# Patient Record
Sex: FEMALE | Race: BLACK OR AFRICAN AMERICAN | NOT HISPANIC OR LATINO | Employment: UNEMPLOYED | URBAN - METROPOLITAN AREA
[De-identification: names, ages, dates, MRNs, and addresses within clinical notes are randomized per-mention and may not be internally consistent; named-entity substitution may affect disease eponyms.]

---

## 2017-11-09 DIAGNOSIS — D21.10: Primary | ICD-10-CM

## 2017-11-21 ENCOUNTER — CLINICAL SUPPORT (OUTPATIENT)
Dept: REHABILITATION | Facility: HOSPITAL | Age: 47
End: 2017-11-21
Attending: SURGERY
Payer: MEDICAID

## 2017-11-21 DIAGNOSIS — M79.642 LEFT HAND PAIN: ICD-10-CM

## 2017-11-21 DIAGNOSIS — M25.649 DECREASED ROM OF FINGER: ICD-10-CM

## 2017-11-21 PROCEDURE — 97165 OT EVAL LOW COMPLEX 30 MIN: CPT | Mod: PO

## 2017-11-21 NOTE — PLAN OF CARE
Occupational Therapy -Hand / Wrist  Evaluation    Patient: Hoda Jaime  Date of Evaluation: 11/21/2017  Referring Physician: Seamus Figueroa III,*  Diagnosis:   1. Left hand pain     2. Decreased ROM of finger       MRN: [id number]  Age: 47 y.o.  Sex: female     Referral Orders:   Eval and treat   Visits Approved: 20    Start Time: 11  End Time: 12  Total Time: 60 min     Hand dominance: Right      Subjective     Hoda is a 47 y.o. 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 No cultural, environmental, or spiritual barriers identified to treatment or learning.    Pain:  Functional Pain Scale Rating 0-10: 3/10 on average  Location: Left Hand  Description: Aching    Surgery 11/1    Occupation:    Working presently: unemployed  Workmen's Compensation:  no  Duties: n/a    Past Medical History/Physical Systems Review:   No past medical history on file.  Review of patient's allergies indicates:  Allergies not on file  No current outpatient prescriptions on file.     No current facility-administered medications for this visit.          Barriers:  Environmental Concerns/ Fall Risk:  None  Barriers to Learning: None   Cultural/Spiritual : None   Developmental/Education: None   Abuse/ Neglect: none   Nutritional Deficit: None   Language: None   Hearing/Vision Deficit: None   Other: No pacemaker, no cancer     Objective     Observation:   Dorsal scarring and volar scarring over middle finger     Sensation:   Grossly Intact       Range of Motion:      AROM   INDEX                          MP Ext/Flex -10/60                         PIP Ext/Flex 0/75                         DIP Ext/Flex 0/45   MIDDLE                                         MP Ext/Flex -22/45                         PIP Ext/Flex 0/75                         DIP Ext/Flex 0/60   RING                            MP Ext/Flex -20/70                         PIP Ext/Flex 0/90                          DIP Ext/Flex 0/60   SMALL                          MP Ext/Flex 0/36                         PIP Ext/Flex 0/90                         DIP Ext/Flex 0/75      Treatment today included: Instruction in HEP.     Assessment     This is a 47 y.o. female referred to outpatient occupational/hand therapy and presents with a medical diagnosis of s/p neoplasm excision and demonstrates limitations as described in the problem list/chart below. Following brief medical record review it is determined that pt will benefit from occupational therapy services in order to maximize pain free and/or functional use of left hand. The following goals were discussed with the patient and patient is in agreement with them as to be addressed in the treatment plan. Pt was given a HEP as indiciated in patient instructions. Pt verbally understood the instructions as they were given and demonstrated proper form and technique during therapy. Pt was advise to perform these exercises free of pain, and to stop performing them if pain occurs. The patient's rehab potential is good.       Goals: ( 6 weeks)   1)   Patient to be IND with HEP and modalities for pain management  2)   Increase ROM 10 degrees in MF flexion plane of motion to increase functional hand use.  3)   Assess  and pinch.   4)   Demonstrate ability to make full composite fist.         Plan     Pt to be treated by Occupational Therapy 2 times per week for 6 weeks during the certification period from 11/21/17 to 12/31/17 to achieve the established goals.     Treatment to include: Paraffin, Fluidotherapy, Manual therapy/joint mobilizations, Modalities for pain management, US 3 mhz, Therapeutic exercises/activities., Strengthening, Scar Management, Joint Protection and Energy Conservation, as well as any other treatments deemed necessary based on the patient's needs or progress.

## 2017-11-21 NOTE — PATIENT INSTRUCTIONS
Complete massage for 2-3 minutes 4 times a day:         Complete 10 repetitions of each exercise 4 times a day:

## 2017-11-27 ENCOUNTER — DOCUMENTATION ONLY (OUTPATIENT)
Dept: REHABILITATION | Facility: HOSPITAL | Age: 47
End: 2017-11-27

## 2017-11-27 DIAGNOSIS — M25.649 DECREASED ROM OF FINGER: ICD-10-CM

## 2017-11-27 DIAGNOSIS — M79.642 LEFT HAND PAIN: ICD-10-CM

## 2017-11-27 NOTE — PROGRESS NOTES
No Show Note/Documenation    Patient: oHda Jaime  Date of Session: 11/27/2017  Diagnosis:   1. Left hand pain     2. Decreased ROM of finger       MRN: [id number]    Hoda Jaime did not attend his/her scheduled therapy appointment today. She did not call to cancel nor reschedule. This is the 1st appointment that she has not attended. No charges have been posted today.           Altagracia Mancia, OT

## 2017-12-04 ENCOUNTER — CLINICAL SUPPORT (OUTPATIENT)
Dept: REHABILITATION | Facility: HOSPITAL | Age: 47
End: 2017-12-04
Attending: SURGERY
Payer: MEDICAID

## 2017-12-04 DIAGNOSIS — M25.649 DECREASED ROM OF FINGER: ICD-10-CM

## 2017-12-04 DIAGNOSIS — M79.642 LEFT HAND PAIN: ICD-10-CM

## 2017-12-04 PROCEDURE — 97530 THERAPEUTIC ACTIVITIES: CPT | Mod: PO

## 2017-12-04 NOTE — PROGRESS NOTES
OT Daily Progress Note    Patient:  Hoda Jaime  [facility] #:  [id number]   Date of Note: 12/04/2017   Referring Physician:  Seamus Figueroa III,*  Diagnosis:    Encounter Diagnoses   Name Primary?    Left hand pain     Decreased ROM of finger         Visit 2 of 20 authorized visits   No shows: 0   Cancellations: 1 - pt was sick     Start Time: 0915  End Time: 1006  Total Time: 51 min  Group Time: 0      SUBJECTIVE: Pt reports compliance with HEP. She reports she had to cancel last week due to feeling ill.      Pain: 2 out of 10    OBJECTIVE:    Patient seen by OT this date. Patient received individual therapy with activities as follows:     Patient received paraffin bath to L hand(s) for 10 minutes to increase blood flow, circulation, pain management and for tissue elasticity prior to therex. Patient received ultrasound to L hand dorsal scar area to increase blood flow, circulation, tissue elasticity, pain management and for wound/scar management for 4 minutes @ 3.3 Mhz, Intensity 0.6 w/cm2 at 20% duty cycle. Patient received ultrasound to L hand volar scar area to increase blood flow, circulation, tissue elasticity, pain management and for wound/scar management for 4 minutes @ 3.3 Mhz, Intensity 0.6 w/cm2 at 100% duty cycle. Performed scar massage to L hand dorsal and volar scar area for 15 minutes to decrease adhesions and improve tensile glide. Pt performed middle finger isolated PIP, DIP, and MP flexion and extension with joint blocking x 15 reps each to maximize ROM; performed table top fist and composite fist x 15 reps each to improve ROM and grasp. Patient performed yellow t-putty for gross , raking, and reverse raking x 15 reps each for intrinsic hand strengthening.  Pt was provided with yellow t-putty for home use.       Written Home Exercises: indicated in pt instructions. Reviewed additional theraputty exercises this date.   Pt demo good understanding of the education provided. Hoda demonstrated  good return demonstration of activities.     Education provided re: POC, HEP  No spiritual or educational barriers to learning provided    Pt has no cultural, educational or language barriers to learning provided.       ASSESSMENT:   Pt will continue to benefit from skilled OT intervention.  Pt tolerated all treatment activities well this date with no adverse effects or complaints of pain.  Dorsal and volar scar is healing well - pt reports compliance with scar massage.  ROM is improving - demoes good isolated motion and gross , but continues with mild limitations.  Patient is making good progress toward established goals.  Patient continues to demonstrate limitation with  ROM, Decreased functional use, Decreased strength and Continued pain.     Goals: ( 6 weeks)   1)   Patient to be IND with HEP and modalities for pain management  2)   Increase ROM 10 degrees in MF flexion plane of motion to increase functional hand use.  3)   Assess  and pinch.   4)   Demonstrate ability to make full composite fist.    PLAN:   Pt to be treated by Occupational Therapy 2 times per week for 6 weeks during the certification period from 11/21/17 to 12/31/17 to achieve the established goals.

## 2017-12-08 ENCOUNTER — CLINICAL SUPPORT (OUTPATIENT)
Dept: REHABILITATION | Facility: HOSPITAL | Age: 47
End: 2017-12-08
Attending: SURGERY
Payer: MEDICAID

## 2017-12-08 DIAGNOSIS — M79.642 LEFT HAND PAIN: ICD-10-CM

## 2017-12-08 DIAGNOSIS — M25.649 DECREASED ROM OF FINGER: ICD-10-CM

## 2017-12-08 PROCEDURE — 97530 THERAPEUTIC ACTIVITIES: CPT | Mod: PO

## 2017-12-08 NOTE — PROGRESS NOTES
OT Daily Progress Note    Patient:  Hoda [facility] #:  [id number]   Date of Note: 12/08/2017   Referring Physician:  Seamus Figueroa III,*  Diagnosis:    Encounter Diagnoses   Name Primary?    Left hand pain     Decreased ROM of finger         Visit 3 of 20 authorized visits   No shows: 0   Cancellations: 1 - pt was sick     Start Time: 0900  End Time: 1000  Total Time: 60 min  Group Time: 0      SUBJECTIVE: Pt reports compliance with HEP. She reports she had to cancel last week due to feeling ill.      Pain: 2 out of 10    OBJECTIVE:    Patient seen by OT this date. Patient received individual therapy with activities as follows:     Patient received paraffin bath to L hand(s) for 10 minutes to increase blood flow, circulation, pain management and for tissue elasticity prior to therex. Patient received ultrasound to L hand dorsal and volar scar area to increase blood flow, circulation, tissue elasticity, pain management and for wound/scar management for 4 minutes volar side and 4 min dorsal side @ 3.3 Mhz, Intensity 0.8 w/cm2 at 100% duty cycle.  Performed scar massage to L hand dorsal and volar scar area for 15 minutes to decrease adhesions and improve tensile glide. Pt performed L hand wave, straight fist, hooks fist, and composite fist x 15 reps each to maximize ROM and improve tensile glide.  Pt performed middle finger isolated PIP, DIP, and MP flexion and extension with joint blocking x 15 reps each to maximize ROM.  Pt performed isolated middle finger extension x 15 reps, closed-chain flexion/extension x 30 reps, and yellow t-putty gross gripping x 15 reps to improve range of motion and strength.  Kinesiotape applied to L middle finger dorsal scar to decrease adhesions.        Written Home Exercises: indicated in pt instructions; theraputty exercises    Pt demo good understanding of the education provided. Hoda demonstrated good return demonstration of activities.     Education provided re: POC,  HEP, purpose and wear time of kinesiotape   No spiritual or educational barriers to learning provided    Pt has no cultural, educational or language barriers to learning provided.       ASSESSMENT:   Pt will continue to benefit from skilled OT intervention.  Pt tolerated all treatment activities well this date with no adverse effects or complaints of pain.  Dorsal and volar scar is healing well - mild adhesion at base of dorsal scar.  Pt reports compliance with scar massage and ROM exercises; compliance is evident as pt demos improvement each tx session.  ROM continues to improve- demoes good isolated motion and gross ; only mild limitations noted.  Patient is making good progress toward established goals.  Patient continues to demonstrate limitation with  ROM, Decreased functional use, Decreased strength and Continued pain.     Goals: ( 6 weeks)   1)   Patient to be IND with HEP and modalities for pain management  2)   Increase ROM 10 degrees in MF flexion plane of motion to increase functional hand use.  3)   Assess  and pinch.   4)   Demonstrate ability to make full composite fist.    PLAN:   Pt to be treated by Occupational Therapy 2 times per week for 6 weeks during the certification period from 11/21/17 to 12/31/17 to achieve the established goals.

## 2017-12-11 ENCOUNTER — CLINICAL SUPPORT (OUTPATIENT)
Dept: REHABILITATION | Facility: HOSPITAL | Age: 47
End: 2017-12-11
Attending: SURGERY
Payer: MEDICAID

## 2017-12-11 DIAGNOSIS — M25.649 DECREASED ROM OF FINGER: ICD-10-CM

## 2017-12-11 DIAGNOSIS — M79.642 LEFT HAND PAIN: ICD-10-CM

## 2017-12-11 PROCEDURE — 97530 THERAPEUTIC ACTIVITIES: CPT | Mod: PO

## 2017-12-11 NOTE — PROGRESS NOTES
OT Daily Progress Note    Patient:  Hoda Jaime  [facility] #:  [id number]   Date of Note: 12/11/2017   Referring Physician:  Seamus Figueroa III,*  Diagnosis:    Encounter Diagnoses   Name Primary?    Left hand pain     Decreased ROM of finger         Visit 3 of 20 authorized visits   No shows: 0   Cancellations: 1     Start Time: 9 am   End Time: 10 am   Total Time: 60 min  Group Time: 0      SUBJECTIVE: Pt reports compliance with HEP. She states that she feels like her hand is feeling good.   Pain: 2 out of 10    OBJECTIVE:    Patient seen by OT this date. Patient received individual therapy with activities as follows:     Patient received paraffin bath to L hand(s) for 10 minutes to increase blood flow, circulation, pain management and for tissue elasticity prior to therex. Patient received ultrasound to L hand dorsal and volar scar area to increase blood flow, circulation, tissue elasticity, pain management and for wound/scar management for 4 minutes volar side and 4 min dorsal side @ 3.3 Mhz, Intensity 0.8 w/cm2 at 100% duty cycle.  Performed scar massage to L hand dorsal and volar scar area for 20 minutes to decrease adhesions and improve tensile glide. Performed scar extractor and flexion with dycem for scar mobilization x 10 min. Pt performed middle finger isolated PIP, DIP, and MP flexion and extension with joint blocking x 15 reps each to maximize ROM.  Pt performed isolated middle finger extension x 15 reps, closed-chain flexion/extension x 30 reps, and yellow t-putty gross gripping x 15 reps to improve range of motion and strength.  Kinesiotape applied to L middle finger dorsal scar to decrease adhesions.       Pt has no cultural, educational or language barriers to learning provided.       ASSESSMENT:   Pt will continue to benefit from skilled OT intervention.  Pt tolerated all treatment activities well this date with no adverse effects or complaints of pain.  Dorsal and volar scar is healing well -  mild adhesion at base of dorsal scar.  Pt reports compliance with scar massage and ROM exercises; compliance is evident as pt demos improvement each tx session.  ROM continues to improve- only slightly limited by scar adhesions on dorsal scar near MP joint. Patient is making good progress toward established goals.  Patient continues to demonstrate limitation with  ROM, Decreased functional use, Decreased strength and Continued pain.     Goals: ( 6 weeks)   1)   Patient to be IND with HEP and modalities for pain management  2)   Increase ROM 10 degrees in MF flexion plane of motion to increase functional hand use.  3)   Assess  and pinch.   4)   Demonstrate ability to make full composite fist.    PLAN:   Pt to be treated by Occupational Therapy 2 times per week for 6 weeks during the certification period from 11/21/17 to 12/31/17 to achieve the established goals.

## 2017-12-21 DIAGNOSIS — D21.10: Primary | ICD-10-CM

## 2017-12-22 ENCOUNTER — CLINICAL SUPPORT (OUTPATIENT)
Dept: REHABILITATION | Facility: HOSPITAL | Age: 47
End: 2017-12-22
Attending: SURGERY
Payer: MEDICAID

## 2017-12-22 DIAGNOSIS — M25.649 DECREASED ROM OF FINGER: ICD-10-CM

## 2017-12-22 DIAGNOSIS — M79.642 LEFT HAND PAIN: ICD-10-CM

## 2017-12-22 PROCEDURE — 97530 THERAPEUTIC ACTIVITIES: CPT | Mod: PO

## 2017-12-22 NOTE — PROGRESS NOTES
OT Daily Progress Note    Patient:  Hoda Jaime  [facility] #:  [id number]   Date of Note: 12/22/2017   Referring Physician:  Seamus Figueroa III,*  Diagnosis:    Encounter Diagnoses   Name Primary?    Left hand pain     Decreased ROM of finger         Visit 5 of 20 authorized visits   No shows: 0   Cancellations: 3    Start Time: 9 am   End Time: 10 am   Total Time: 60 min  Group Time: 0      SUBJECTIVE: Pt reports compliance with HEP. She states that she feels like her hand is feeling good. She has some decreased strength.   Pain: 2 out of 10    OBJECTIVE:    Patient seen by OT this date. Patient received individual therapy with activities as follows:     Patient received paraffin bath to L hand(s) for 10 minutes to increase blood flow, circulation, pain management and for tissue elasticity prior to therex. Patient received ultrasound to L hand dorsal and volar scar area to increase blood flow, circulation, tissue elasticity, pain management and for wound/scar management for 4 minutes volar side and 4 min dorsal side @ 3.3 Mhz, Intensity 0.8 w/cm2 at 100% duty cycle.  Performed scar massage to L hand dorsal and volar scar area for 20 minutes to decrease adhesions and improve tensile glide. Performed scar extractor and flexion with dycem for scar mobilization x 10 min. Pt performed middle finger isolated PIP, DIP, and MP flexion and extension with joint blocking x 15 reps each to maximize ROM.  Pt performed isolated middle finger extension x 15 reps, closed-chain flexion/extension x 30 reps, and yellow t-putty gross gripping x 15 reps to improve range of motion and strength.  Kinesiotape applied to L middle finger dorsal scar to decrease adhesions.       Pt has no cultural, educational or language barriers to learning provided.       ASSESSMENT:   Pt will continue to benefit from skilled OT intervention.  Pt tolerated all treatment activities well this date with no adverse effects or complaints of pain.  Dorsal  and volar scar is healing well - mild adhesion at base of dorsal scar.  Pt reports compliance with scar massage and ROM exercises; compliance is evident as pt demos improvement each tx session.  ROM continues to improve- only slightly limited by scar adhesions on dorsal scar near MP joint. Dorsal scar most limiting. Patient is making good progress toward established goals.  Patient continues to demonstrate limitation with  ROM, Decreased functional use, Decreased strength and Continued pain.     Goals: ( 6 weeks)   1)   Patient to be IND with HEP and modalities for pain management  2)   Increase ROM 10 degrees in MF flexion plane of motion to increase functional hand use.  3)   Assess  and pinch.   4)   Demonstrate ability to make full composite fist.    PLAN:   Pt to be treated by Occupational Therapy 2 times per week for 6 weeks during the certification period from 11/21/17 to 12/31/17 to achieve the established goals.

## 2018-01-05 ENCOUNTER — CLINICAL SUPPORT (OUTPATIENT)
Dept: REHABILITATION | Facility: HOSPITAL | Age: 48
End: 2018-01-05
Attending: SURGERY
Payer: MEDICAID

## 2018-01-05 DIAGNOSIS — M79.642 LEFT HAND PAIN: ICD-10-CM

## 2018-01-05 DIAGNOSIS — M25.649 DECREASED ROM OF FINGER: ICD-10-CM

## 2018-01-05 PROCEDURE — 97530 THERAPEUTIC ACTIVITIES: CPT | Mod: PO

## 2018-01-08 NOTE — PROGRESS NOTES
OT Daily Progress Note    Patient:  Hoda Jaime  [facility] #:  [id number]   Date of Note: 01/08/2018   Referring Physician:  Seamus Figueroa III,*  Diagnosis:    Encounter Diagnoses   Name Primary?    Left hand pain     Decreased ROM of finger         Visit 6 of 20 authorized visits   No shows: 0   Cancellations: 4    Start Time: 9 am   End Time: 10 am   Total Time: 60 min  Group Time: 0      SUBJECTIVE: Pt reports compliance with HEP. She reports her scar is much better, she is slowly beginning to try to  heavier objects but still has some hypersensitivity.  Pain: 2 out of 10    OBJECTIVE:    Patient seen by OT this date. Patient received individual therapy with activities as follows:     Patient received paraffin bath to L hand(s) for 10 minutes to increase blood flow, circulation, pain management and for tissue elasticity prior to therex. Patient received ultrasound to L hand dorsal and volar scar area to increase blood flow, circulation, tissue elasticity, pain management and for wound/scar management for 4 minutes volar side and 4 min dorsal side @ 3.3 Mhz, Intensity 0.8 w/cm2 at 100% duty cycle.  Performed scar massage to L hand dorsal and volar scar area for 20 minutes to decrease adhesions and improve tensile glide. Performed scar extractor and flexion with dycem for scar mobilization x 10 min. Pt performed middle finger isolated PIP, DIP, and MP flexion and extension with joint blocking x 15 reps each to maximize ROM.  Pt performed isolated middle finger extension x 15 reps, closed-chain flexion/extension x 30 reps, and yellow t-putty gross gripping x 15 reps to improve range of motion and strength.  Kinesiotape applied to L middle finger dorsal scar to decrease adhesions.       Pt has no cultural, educational or language barriers to learning provided.       ASSESSMENT:   Pt will continue to benefit from skilled OT intervention.  Pt tolerated all treatment activities well this date with no adverse  effects or complaints of pain.  Dorsal and volar scar is healing well - mild adhesion at base of dorsal scar.  Pt reports compliance with scar massage and ROM exercises; compliance is evident as pt demos improvement each tx session.  There is still minimal limitation with MP extension. ROM continues to improve- only slightly limited by scar adhesions on dorsal scar near MP joint. Dorsal scar most limiting. Patient is making good progress toward established goals.  Patient continues to demonstrate limitation with  ROM, Decreased functional use, Decreased strength and Continued pain.     Goals: ( 6 weeks)   1)   Patient to be IND with HEP and modalities for pain management  2)   Increase ROM 10 degrees in MF flexion plane of motion to increase functional hand use.  3)   Assess  and pinch.   4)   Demonstrate ability to make full composite fist.    PLAN:   Pt to be treated by Occupational Therapy 2 times per week for 6 weeks during the certification period from 11/21/17 to 12/31/17 to achieve the established goals.